# Patient Record
Sex: FEMALE | Race: WHITE | NOT HISPANIC OR LATINO | URBAN - NONMETROPOLITAN AREA
[De-identification: names, ages, dates, MRNs, and addresses within clinical notes are randomized per-mention and may not be internally consistent; named-entity substitution may affect disease eponyms.]

---

## 2023-04-26 ENCOUNTER — OFFICE (OUTPATIENT)
Dept: URBAN - NONMETROPOLITAN AREA CLINIC 1 | Facility: CLINIC | Age: 78
End: 2023-04-26

## 2023-04-26 VITALS — DIASTOLIC BLOOD PRESSURE: 72 MMHG | HEART RATE: 74 BPM | SYSTOLIC BLOOD PRESSURE: 126 MMHG | WEIGHT: 222 LBS

## 2023-04-26 DIAGNOSIS — E78.5 HYPERLIPIDEMIA, UNSPECIFIED: ICD-10-CM

## 2023-04-26 DIAGNOSIS — E66.9 OBESITY, UNSPECIFIED: ICD-10-CM

## 2023-04-26 DIAGNOSIS — D64.9 ANEMIA, UNSPECIFIED: ICD-10-CM

## 2023-04-26 DIAGNOSIS — E11.9 TYPE 2 DIABETES MELLITUS WITHOUT COMPLICATIONS: ICD-10-CM

## 2023-04-26 DIAGNOSIS — I25.10 ATHEROSCLEROTIC HEART DISEASE OF NATIVE CORONARY ARTERY WITH: ICD-10-CM

## 2023-04-26 DIAGNOSIS — J45.909 UNSPECIFIED ASTHMA, UNCOMPLICATED: ICD-10-CM

## 2023-04-26 DIAGNOSIS — I10 ESSENTIAL (PRIMARY) HYPERTENSION: ICD-10-CM

## 2023-04-26 PROCEDURE — 99213 OFFICE O/P EST LOW 20 MIN: CPT | Performed by: NURSE PRACTITIONER

## 2023-04-26 NOTE — SERVICEHPINOTES
She was hospitalized April 12 when she presented with near syncope and shortness of breath. She was found to be anemic and hypotensive. She had not been seeing blood with her stools or dark stools. No abdominal pain. On arrival to the ER her hemoglobin was 6.8 with a positive Hemoccult.
br 

br Her subsequent EGD by Dr. Fernández-br  Findings:  Esophagus: 	Large hiatal hernia. Z line at 32 centimeters. Esophagus otherwise normal. Stomach: Sliding hiatal hernia seen during retroflexion. Random gastric biopsies were taken to rule out H pylori given indication. Stomach was otherwise normal on direct and retroflexion view. Pylorus: Normal Duodenum: Examined duodenum normalbr Final Pathologic Diagnosis:br   ENDOSCOPIC BIOPSY, "GASTRIC":br   - Benign gastric mucosa with patchy mild chronic gastritis arising in a background of mild features of reactive gastropathy.br   - No evidence of intestinal metaplasia/dysplasia.br   - Negative for Helicobacter pylori-like organisms
br
br   Colonoscopy by Dr. Sloan 4/16/23-
brFindings: br The cecum is normal except a 6 millimeter diameter sessile polyp was completely removed with cold snare polypectomy from the cecum adjacent to the appendiceal orifice. There was residual stool scattered throughout the colon which limits visualization. A 1 centimeter diameter sessile polyp was completely removed with snare cautery polypectomy from the sigmoid colon. A few small diverticuli were present within the sigmoid colon but no evidence for hemorrhage, inflammation, or stricture. The colon is mildly and diffusely dilated. The colonic mucosal vascular pattern, folds, and color are otherwise normal with no AVMs, masses, ulcerations, blood, or colitis. Retroflex exam and anal inspection were unremarkable except for small internal hemorrhoids.Postoperative diagnosis:brSigmoid and cecal colon polyps, removed. Mild sigmoid diverticulosis otherwise normal colonoscopy to the ileum. Inadequate colon preparation.brPathologic Diagnosis:br   1.   ENDOSCOPIC BIOPSY CECAL POLYP:br     - Tubular adenoma. br   2.   ENDOSCOPIC BIOPSY SIGMOID COLON POLYP:br     - Tubular adenoma.Recommendations: brContinue current home medications, diet, and activities. Daily MiraLax to control constipation. Okay to Discharge home on lifelong oral iron supplementation. Follow-up pathology results. Repeat screening colonoscopy in 3 years. Return to clinic as needed.
br
br Today she says she feels a whole lot better. No nausea or abdominal pain. No evidence of GI blood loss. Her chronic illnesses include hyperlipidemia, hypertension, depression/anxiety, asthma, coronary artery disease, obesity, degenerative joint disease, diabetes, chronic kidney disease, peripheral vascular disease.br

## 2023-04-26 NOTE — SERVICENOTES
She is tolerating her iron supplement without GI upset.  I am going to check her CBC today and I will call her with results.

## 2023-04-27 LAB
CBC, PLATELET, NO DIFFERENTIAL: HEMATOCRIT: 39.1 % (ref 34–46.6)
CBC, PLATELET, NO DIFFERENTIAL: HEMOGLOBIN: 11.4 G/DL (ref 11.1–15.9)
CBC, PLATELET, NO DIFFERENTIAL: MCH: 22.9 PG — LOW (ref 26.6–33)
CBC, PLATELET, NO DIFFERENTIAL: MCHC: 29.2 G/DL — LOW (ref 31.5–35.7)
CBC, PLATELET, NO DIFFERENTIAL: MCV: 79 FL (ref 79–97)
CBC, PLATELET, NO DIFFERENTIAL: NRBC: (no result)
CBC, PLATELET, NO DIFFERENTIAL: PLATELETS: 252 X10E3/UL (ref 150–450)
CBC, PLATELET, NO DIFFERENTIAL: RBC: 4.98 X10E6/UL (ref 3.77–5.28)
CBC, PLATELET, NO DIFFERENTIAL: RDW: 20.3 % — HIGH (ref 11.7–15.4)
CBC, PLATELET, NO DIFFERENTIAL: WBC: 7 X10E3/UL (ref 3.4–10.8)